# Patient Record
Sex: FEMALE | Race: WHITE | NOT HISPANIC OR LATINO | Employment: UNEMPLOYED | URBAN - METROPOLITAN AREA
[De-identification: names, ages, dates, MRNs, and addresses within clinical notes are randomized per-mention and may not be internally consistent; named-entity substitution may affect disease eponyms.]

---

## 2023-07-05 DIAGNOSIS — R10.2 PELVIC AND PERINEAL PAIN: ICD-10-CM

## 2023-07-06 RX ORDER — MELOXICAM 15 MG/1
15 TABLET ORAL DAILY
Qty: 30 TABLET | Refills: 1 | Status: SHIPPED | OUTPATIENT
Start: 2023-07-06 | End: 2023-09-21

## 2023-09-21 DIAGNOSIS — R10.2 PELVIC AND PERINEAL PAIN: ICD-10-CM

## 2023-09-21 RX ORDER — MELOXICAM 15 MG/1
15 TABLET ORAL DAILY
Qty: 30 TABLET | Refills: 1 | Status: SHIPPED | OUTPATIENT
Start: 2023-09-21 | End: 2023-11-20

## 2023-10-30 ENCOUNTER — OFFICE VISIT (OUTPATIENT)
Dept: PRIMARY CARE | Facility: CLINIC | Age: 38
End: 2023-10-30
Payer: COMMERCIAL

## 2023-10-30 VITALS
HEART RATE: 76 BPM | SYSTOLIC BLOOD PRESSURE: 107 MMHG | OXYGEN SATURATION: 99 % | HEIGHT: 62 IN | TEMPERATURE: 99.7 F | WEIGHT: 113.8 LBS | BODY MASS INDEX: 20.94 KG/M2 | DIASTOLIC BLOOD PRESSURE: 71 MMHG

## 2023-10-30 DIAGNOSIS — R09.81 NASAL CONGESTION: ICD-10-CM

## 2023-10-30 DIAGNOSIS — J01.00 ACUTE MAXILLARY SINUSITIS, RECURRENCE NOT SPECIFIED: Primary | ICD-10-CM

## 2023-10-30 DIAGNOSIS — R51.9 SINUS HEADACHE: ICD-10-CM

## 2023-10-30 PROCEDURE — 99213 OFFICE O/P EST LOW 20 MIN: CPT | Performed by: NURSE PRACTITIONER

## 2023-10-30 PROCEDURE — 1036F TOBACCO NON-USER: CPT | Performed by: NURSE PRACTITIONER

## 2023-10-30 RX ORDER — VALACYCLOVIR HYDROCHLORIDE 1 G/1
TABLET, FILM COATED ORAL
COMMUNITY

## 2023-10-30 RX ORDER — AZITHROMYCIN 250 MG/1
TABLET, FILM COATED ORAL
Qty: 6 TABLET | Refills: 0 | Status: SHIPPED | OUTPATIENT
Start: 2023-10-30 | End: 2023-11-04

## 2023-10-30 RX ORDER — CLOBETASOL PROPIONATE 0.46 MG/ML
SOLUTION TOPICAL
COMMUNITY
Start: 2022-02-23

## 2023-10-30 RX ORDER — KETOCONAZOLE 20 MG/ML
SHAMPOO, SUSPENSION TOPICAL
COMMUNITY
Start: 2022-02-23

## 2023-10-30 RX ORDER — PHENYLPROPANOLAMINE/CLEMASTINE 75-1.34MG
TABLET, EXTENDED RELEASE ORAL
COMMUNITY

## 2023-10-30 RX ORDER — ALBUTEROL SULFATE 90 UG/1
AEROSOL, METERED RESPIRATORY (INHALATION)
COMMUNITY
Start: 2017-04-27 | End: 2024-01-23 | Stop reason: SDUPTHER

## 2023-10-30 RX ORDER — MULTIVITAMIN
1 TABLET ORAL
COMMUNITY
Start: 2014-09-25

## 2023-10-30 RX ORDER — SPIRONOLACTONE 25 MG/1
25 TABLET ORAL 2 TIMES DAILY
COMMUNITY

## 2023-10-30 NOTE — PROGRESS NOTES
"Subjective   Patient ID: Lou Boateng is a 37 y.o. female who presents for sinus concern.    HPI   Patient in office for nasal congestion, facial pressure, sinus pressure, and sinus headache x 1 week. No other symptoms or concerns today.    Review of Systems  Constitutional:  Negative for fever. Negative for activity change, appetite change, chills, diaphoresis, fatigue and unexpected weight change.   HENT:  Positive for congestion, postnasal drip, sinus pressure, and sinus pain. Negative for sore throat, dental problem, ear discharge, ear pain, facial swelling, hearing loss, mouth sores, sore throat, nosebleeds, rhinorrhea, sneezing, tinnitus, trouble swallowing and voice change.    Eyes:  Negative for photophobia, pain, discharge, redness, itching and visual disturbance.   Respiratory:  Negative for cough. Negative for apnea, choking, chest tightness, shortness of breath, wheezing and stridor.    Cardiovascular:  Negative for chest pain, palpitations and leg swelling.   Gastrointestinal:  Negative for abdominal pain, diarrhea, nausea and vomiting.   Neurological:  Negative for dizziness, syncope, weakness, light-headedness. Positive for sinus headache.   Hematological:  Negative for adenopathy. Does not bruise/bleed easily.     Objective   /71   Pulse 76   Temp 37.6 °C (99.7 °F) (Temporal)   Ht 1.575 m (5' 2\")   Wt 51.6 kg (113 lb 12.8 oz)   SpO2 99%   BMI 20.81 kg/m²     Physical Exam  Constitutional:       Appearance: Normal appearance.   HENT:      Head: Normocephalic.      Right Ear: Tympanic membrane, ear canal and external ear normal.      Left Ear: Tympanic membrane, ear canal and external ear normal.      Nose: Congestion present.      Mouth/Throat:      Mouth: Mucous membranes are moist.      Pharynx: No oropharyngeal redness or exudate. Maxillary sinus areas tender to palpation.   Eyes:      Conjunctiva/sclera: Conjunctivae normal.   Cardiovascular:      Rate and Rhythm: Normal rate and " regular rhythm.      Pulses: Normal pulses.      Heart sounds:   Pulmonary:      Effort: Pulmonary effort is normal.      Breath sounds: normal  Musculoskeletal:      Cervical back: Neck supple. No rigidity or tenderness.      Right lower leg: No edema.      Left lower leg: No edema.   Lymphadenopathy:      Cervical: No cervical adenopathy.   Skin:     General: Skin is warm.      Coloration: Skin is not jaundiced or pale.   Neurological:      General: No focal deficit present.      Mental Status: She is alert.      Gait: Gait normal.     Assessment/Plan    Exam findings reviewed with patient. Medications discussed. Patient may use OTC Tylenol for pain and Flonase for nasal congestion. Advocated rest and proper hydration. Follow up in 1 week or earlier if no improvement.

## 2023-11-09 ENCOUNTER — OFFICE VISIT (OUTPATIENT)
Dept: PRIMARY CARE | Facility: CLINIC | Age: 38
End: 2023-11-09
Payer: COMMERCIAL

## 2023-11-09 VITALS
BODY MASS INDEX: 21.18 KG/M2 | TEMPERATURE: 98.6 F | OXYGEN SATURATION: 99 % | HEART RATE: 99 BPM | WEIGHT: 115.8 LBS | DIASTOLIC BLOOD PRESSURE: 74 MMHG | SYSTOLIC BLOOD PRESSURE: 106 MMHG

## 2023-11-09 DIAGNOSIS — R53.81 MALAISE: ICD-10-CM

## 2023-11-09 DIAGNOSIS — R09.81 NASAL CONGESTION: ICD-10-CM

## 2023-11-09 DIAGNOSIS — R51.9 SINUS HEADACHE: ICD-10-CM

## 2023-11-09 DIAGNOSIS — J02.9 SORE THROAT: Primary | ICD-10-CM

## 2023-11-09 LAB
POC RAPID MONO: NEGATIVE
POC RAPID STREP: NEGATIVE

## 2023-11-09 PROCEDURE — 99213 OFFICE O/P EST LOW 20 MIN: CPT | Performed by: NURSE PRACTITIONER

## 2023-11-09 PROCEDURE — 87634 RSV DNA/RNA AMP PROBE: CPT

## 2023-11-09 PROCEDURE — 1036F TOBACCO NON-USER: CPT | Performed by: NURSE PRACTITIONER

## 2023-11-09 PROCEDURE — 87636 SARSCOV2 & INF A&B AMP PRB: CPT

## 2023-11-09 PROCEDURE — 86308 HETEROPHILE ANTIBODY SCREEN: CPT | Performed by: NURSE PRACTITIONER

## 2023-11-09 PROCEDURE — 87880 STREP A ASSAY W/OPTIC: CPT | Performed by: NURSE PRACTITIONER

## 2023-11-09 PROCEDURE — 87081 CULTURE SCREEN ONLY: CPT

## 2023-11-09 NOTE — PROGRESS NOTES
Subjective   Patient ID: Lou Boateng is a 37 y.o. female who presents for recurring URI symptoms.    HPI   Patient in office for follow-up on nasal congestion, facial pressure, sinus pressure, and sinus headache. She was seen on 10/30/23 and started on an Z-pack. The symptoms stopped on the antibiotic but have now returned x 3 days. The patient complains of malaise and sore throat in addition to the previous symptoms.  Hx of tonsillectomy. Non-smoker. No other  concerns today.    I/O strep and mono are negative.     Review of Systems  Constitutional:  Negative for fever. Negative for activity change, appetite change, chills, diaphoresis, fatigue and unexpected weight change. Positive for malaise.  HENT:  Positive for congestion, sore throat, postnasal drip, sinus pressure, and sinus pain. Negative for sore throat, dental problem, ear discharge, ear pain, facial swelling, hearing loss, mouth sores, sore throat, nosebleeds, rhinorrhea, sneezing, tinnitus, trouble swallowing and voice change.    Eyes:  Negative for photophobia, pain, discharge, redness, itching and visual disturbance.   Respiratory:  Negative for cough. Negative for apnea, choking, chest tightness, shortness of breath, wheezing and stridor.    Cardiovascular:  Negative for chest pain, palpitations and leg swelling.   Gastrointestinal:  Negative for abdominal pain, diarrhea, nausea and vomiting.   Neurological:  Negative for dizziness, syncope, weakness, light-headedness. Positive for sinus headache.   Hematological:  Negative for adenopathy. Does not bruise/bleed easily.     Objective   /74   Pulse 99   Temp 37 °C (98.6 °F) (Temporal)   Wt 52.5 kg (115 lb 12.8 oz)   SpO2 99%   BMI 21.18 kg/m²     Physical Exam  Constitutional:       Appearance: Normal appearance.   HENT:      Head: Normocephalic.      Right Ear: Tympanic membrane, ear canal and external ear normal.      Left Ear: Tympanic membrane, ear canal and external ear normal.       Nose: Congestion present. Sinus areas are non-tender to palpation.      Mouth/Throat:      Mouth: Mucous membranes are moist.      Pharynx:  Mild oropharyngeal redness; no exudate. No tonsils visualized.    Eyes:      Conjunctiva/sclera: Conjunctivae normal.   Cardiovascular:      Rate and Rhythm: Normal rate and regular rhythm.      Pulses: Normal pulses.      Heart sounds:   Pulmonary:      Effort: Pulmonary effort is normal.      Breath sounds: normal  Musculoskeletal:      Cervical back: Neck supple. No rigidity or tenderness.      Right lower leg: No edema.      Left lower leg: No edema.   Lymphadenopathy:      Cervical: No cervical adenopathy.   Skin:     General: Skin is warm.      Coloration: Skin is not jaundiced or pale.   Neurological:      General: No focal deficit present.      Mental Status: She is alert.      Gait: Gait normal.     Assessment/Plan    Exam findings reviewed with patient. Medications discussed with patient. The patient may use Tylenol for fever and pain control, Flonase for nasal congestion, and lozenges for sore throat. Advised patient to avoid spicy, hot, and acidic foods and to gargle with tepid salt water 3-4x/day. Advocated rest and proper hydration and standard COVID, RSV, and Flu precautions. Emergent signs and symptoms reviewed: uncontrolled fever, shortness of breath or chest pain, SaO2 level <95% on room air, uncontrolled vomiting or diarrhea. The patient verbalized understanding. The patient knows where to get emergent help. We will call with lab results and update the patient on the plan of care. Follow up in 1 week or earlier if no improvement.

## 2023-11-10 ENCOUNTER — TELEPHONE (OUTPATIENT)
Dept: PRIMARY CARE | Facility: CLINIC | Age: 38
End: 2023-11-10
Payer: COMMERCIAL

## 2023-11-10 LAB
FLUAV RNA RESP QL NAA+PROBE: NOT DETECTED
FLUBV RNA RESP QL NAA+PROBE: NOT DETECTED
RSV RNA RESP QL NAA+PROBE: DETECTED
SARS-COV-2 RNA RESP QL NAA+PROBE: NOT DETECTED

## 2023-11-11 NOTE — RESULT ENCOUNTER NOTE
Patient notified of results. Etiology and treatment of RSV infection discussed. Contact precautions reviewed. Focus on symptom control discussed. The patient may continue to use OCT medications as needed. Advocated rest and proper hydration. Emergent signs and symptoms reviewed; the patient verbalized understanding. The patient knows where to get emergent help. Follow up in 1 week or earlier if no improvement.  and

## 2023-11-12 LAB — S PYO THROAT QL CULT: NORMAL

## 2023-11-19 DIAGNOSIS — R10.2 PELVIC AND PERINEAL PAIN: ICD-10-CM

## 2023-11-20 RX ORDER — MELOXICAM 15 MG/1
15 TABLET ORAL DAILY
Qty: 30 TABLET | Refills: 1 | Status: SHIPPED | OUTPATIENT
Start: 2023-11-20 | End: 2024-01-23 | Stop reason: SDUPTHER

## 2024-01-08 ENCOUNTER — HOSPITAL ENCOUNTER (OUTPATIENT)
Dept: RADIOLOGY | Facility: EXTERNAL LOCATION | Age: 39
Discharge: HOME | End: 2024-01-08
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
## 2024-01-23 ENCOUNTER — OFFICE VISIT (OUTPATIENT)
Dept: PRIMARY CARE | Facility: CLINIC | Age: 39
End: 2024-01-23
Payer: COMMERCIAL

## 2024-01-23 VITALS
TEMPERATURE: 97.8 F | SYSTOLIC BLOOD PRESSURE: 112 MMHG | BODY MASS INDEX: 19.12 KG/M2 | HEIGHT: 64 IN | HEART RATE: 88 BPM | OXYGEN SATURATION: 99 % | RESPIRATION RATE: 16 BRPM | DIASTOLIC BLOOD PRESSURE: 76 MMHG | WEIGHT: 112 LBS

## 2024-01-23 DIAGNOSIS — Z00.00 HEALTHCARE MAINTENANCE: Primary | ICD-10-CM

## 2024-01-23 DIAGNOSIS — H60.8X3 CHRONIC ECZEMATOUS OTITIS EXTERNA OF BOTH EARS: ICD-10-CM

## 2024-01-23 DIAGNOSIS — R10.2 PELVIC AND PERINEAL PAIN: ICD-10-CM

## 2024-01-23 DIAGNOSIS — Z12.31 BREAST CANCER SCREENING BY MAMMOGRAM: ICD-10-CM

## 2024-01-23 DIAGNOSIS — S16.1XXA STRAIN OF NECK MUSCLE, INITIAL ENCOUNTER: ICD-10-CM

## 2024-01-23 DIAGNOSIS — J30.9 ALLERGIC RHINITIS, UNSPECIFIED SEASONALITY, UNSPECIFIED TRIGGER: Primary | ICD-10-CM

## 2024-01-23 DIAGNOSIS — J45.909 REACTIVE AIRWAY DISEASE WITHOUT COMPLICATION, UNSPECIFIED ASTHMA SEVERITY, UNSPECIFIED WHETHER PERSISTENT (HHS-HCC): ICD-10-CM

## 2024-01-23 PROBLEM — L30.1 DYSHIDROTIC ECZEMA: Status: RESOLVED | Noted: 2024-01-23 | Resolved: 2024-01-23

## 2024-01-23 PROBLEM — B00.9 HERPES SIMPLEX: Status: RESOLVED | Noted: 2024-01-23 | Resolved: 2024-01-23

## 2024-01-23 PROBLEM — H10.10 ALLERGIC CONJUNCTIVITIS: Status: RESOLVED | Noted: 2024-01-23 | Resolved: 2024-01-23

## 2024-01-23 PROCEDURE — 87389 HIV-1 AG W/HIV-1&-2 AB AG IA: CPT

## 2024-01-23 PROCEDURE — 1036F TOBACCO NON-USER: CPT | Performed by: FAMILY MEDICINE

## 2024-01-23 PROCEDURE — 36415 COLL VENOUS BLD VENIPUNCTURE: CPT

## 2024-01-23 PROCEDURE — 90471 IMMUNIZATION ADMIN: CPT | Performed by: FAMILY MEDICINE

## 2024-01-23 PROCEDURE — 82947 ASSAY GLUCOSE BLOOD QUANT: CPT

## 2024-01-23 PROCEDURE — 90715 TDAP VACCINE 7 YRS/> IM: CPT | Performed by: FAMILY MEDICINE

## 2024-01-23 PROCEDURE — 99396 PREV VISIT EST AGE 40-64: CPT | Performed by: FAMILY MEDICINE

## 2024-01-23 PROCEDURE — 80061 LIPID PANEL: CPT

## 2024-01-23 RX ORDER — CYCLOBENZAPRINE HCL 5 MG
TABLET ORAL
COMMUNITY
Start: 2024-01-08 | End: 2024-01-23 | Stop reason: WASHOUT

## 2024-01-23 RX ORDER — MELOXICAM 15 MG/1
15 TABLET ORAL DAILY
Qty: 30 TABLET | Refills: 1 | Status: SHIPPED | OUTPATIENT
Start: 2024-01-23 | End: 2024-03-18

## 2024-01-23 RX ORDER — DICLOFENAC POTASSIUM 50 MG/1
50 TABLET, FILM COATED ORAL EVERY 12 HOURS PRN
COMMUNITY
Start: 2024-01-08 | End: 2024-01-23 | Stop reason: WASHOUT

## 2024-01-23 RX ORDER — FLUOCINOLONE ACETONIDE 0.11 MG/ML
OIL AURICULAR (OTIC)
Qty: 20 ML | Refills: 3 | Status: SHIPPED | OUTPATIENT
Start: 2024-01-23 | End: 2024-04-30 | Stop reason: WASHOUT

## 2024-01-23 RX ORDER — MELOXICAM 15 MG/1
15 TABLET ORAL DAILY
Qty: 30 TABLET | Refills: 1 | OUTPATIENT
Start: 2024-01-23

## 2024-01-23 RX ORDER — ALBUTEROL SULFATE 90 UG/1
2 AEROSOL, METERED RESPIRATORY (INHALATION) EVERY 4 HOURS PRN
Qty: 8 G | Refills: 5 | Status: SHIPPED | OUTPATIENT
Start: 2024-01-23 | End: 2024-04-30 | Stop reason: WASHOUT

## 2024-01-23 ASSESSMENT — ENCOUNTER SYMPTOMS
FREQUENCY: 0
DYSURIA: 0
COUGH: 0
ABDOMINAL PAIN: 0
POLYDIPSIA: 0
EYE DISCHARGE: 0
HEADACHES: 1
APPETITE CHANGE: 0
DIARRHEA: 0
AGITATION: 0
PALPITATIONS: 0
SORE THROAT: 0
VOMITING: 0
DIZZINESS: 0
CONFUSION: 0
ADENOPATHY: 0
SHORTNESS OF BREATH: 0
CHILLS: 0
BACK PAIN: 1
ABDOMINAL DISTENTION: 0
BLOOD IN STOOL: 0
RHINORRHEA: 0
WEAKNESS: 0
FEVER: 0
NAUSEA: 0
DIFFICULTY URINATING: 0
NUMBNESS: 0
SINUS PAIN: 0
HALLUCINATIONS: 0
CONSTIPATION: 0
MYALGIAS: 0

## 2024-01-23 ASSESSMENT — PROMIS GLOBAL HEALTH SCALE
RATE_AVERAGE_PAIN: 4
RATE_QUALITY_OF_LIFE: EXCELLENT
RATE_AVERAGE_FATIGUE: SEVERE
CARRYOUT_SOCIAL_ACTIVITIES: EXCELLENT
CARRYOUT_PHYSICAL_ACTIVITIES: COMPLETELY
RATE_MENTAL_HEALTH: VERY GOOD
RATE_SOCIAL_SATISFACTION: VERY GOOD
EMOTIONAL_PROBLEMS: SOMETIMES
RATE_PHYSICAL_HEALTH: VERY GOOD
RATE_GENERAL_HEALTH: VERY GOOD
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
## 2024-01-23 NOTE — ASSESSMENT & PLAN NOTE
Recommended if not needed number back pain is not improving within 2 weeks to make appointment physical therapy

## 2024-01-24 LAB
CHOLEST SERPL-MCNC: 163 MG/DL (ref 0–199)
CHOLESTEROL/HDL RATIO: 2.2
GLUCOSE P FAST SERPL-MCNC: 91 MG/DL (ref 74–99)
HDLC SERPL-MCNC: 73.6 MG/DL
HIV 1+2 AB+HIV1 P24 AG SERPL QL IA: NONREACTIVE
LDLC SERPL CALC-MCNC: 83 MG/DL
NON HDL CHOLESTEROL: 89 MG/DL (ref 0–149)
TRIGL SERPL-MCNC: 31 MG/DL (ref 0–149)
VLDL: 6 MG/DL (ref 0–40)

## 2024-02-05 ENCOUNTER — APPOINTMENT (OUTPATIENT)
Dept: PRIMARY CARE | Facility: CLINIC | Age: 39
End: 2024-02-05
Payer: COMMERCIAL

## 2024-02-13 ENCOUNTER — HOSPITAL ENCOUNTER (OUTPATIENT)
Dept: RADIOLOGY | Facility: HOSPITAL | Age: 39
Discharge: HOME | End: 2024-02-13
Payer: COMMERCIAL

## 2024-02-13 DIAGNOSIS — Z12.31 BREAST CANCER SCREENING BY MAMMOGRAM: ICD-10-CM

## 2024-02-13 PROCEDURE — 77063 BREAST TOMOSYNTHESIS BI: CPT | Performed by: RADIOLOGY

## 2024-02-13 PROCEDURE — 77067 SCR MAMMO BI INCL CAD: CPT

## 2024-02-13 PROCEDURE — 77067 SCR MAMMO BI INCL CAD: CPT | Performed by: RADIOLOGY

## 2024-02-21 DIAGNOSIS — B37.31 YEAST VAGINITIS: Primary | ICD-10-CM

## 2024-02-21 RX ORDER — FLUCONAZOLE 150 MG/1
TABLET ORAL
Qty: 2 TABLET | Refills: 0 | Status: SHIPPED | OUTPATIENT
Start: 2024-02-21 | End: 2024-04-30 | Stop reason: WASHOUT

## 2024-03-07 ENCOUNTER — APPOINTMENT (OUTPATIENT)
Dept: PRIMARY CARE | Facility: CLINIC | Age: 39
End: 2024-03-07
Payer: COMMERCIAL

## 2024-03-18 DIAGNOSIS — R10.2 PELVIC AND PERINEAL PAIN: ICD-10-CM

## 2024-03-18 RX ORDER — MELOXICAM 15 MG/1
15 TABLET ORAL DAILY
Qty: 30 TABLET | Refills: 1 | Status: SHIPPED | OUTPATIENT
Start: 2024-03-18 | End: 2024-04-30 | Stop reason: WASHOUT

## 2024-04-29 ENCOUNTER — PATIENT MESSAGE (OUTPATIENT)
Dept: PRIMARY CARE | Facility: CLINIC | Age: 39
End: 2024-04-29
Payer: COMMERCIAL

## 2024-04-30 ENCOUNTER — OFFICE VISIT (OUTPATIENT)
Dept: PRIMARY CARE | Facility: CLINIC | Age: 39
End: 2024-04-30
Payer: COMMERCIAL

## 2024-04-30 VITALS
DIASTOLIC BLOOD PRESSURE: 86 MMHG | TEMPERATURE: 98.3 F | SYSTOLIC BLOOD PRESSURE: 128 MMHG | BODY MASS INDEX: 19.35 KG/M2 | RESPIRATION RATE: 18 BRPM | HEART RATE: 96 BPM | OXYGEN SATURATION: 98 % | WEIGHT: 111 LBS

## 2024-04-30 DIAGNOSIS — F32.A DEPRESSION, UNSPECIFIED DEPRESSION TYPE: Primary | ICD-10-CM

## 2024-04-30 PROCEDURE — 90739 HEPB VACC 2/4 DOSE ADULT IM: CPT | Performed by: FAMILY MEDICINE

## 2024-04-30 PROCEDURE — 90471 IMMUNIZATION ADMIN: CPT | Performed by: FAMILY MEDICINE

## 2024-04-30 PROCEDURE — 99214 OFFICE O/P EST MOD 30 MIN: CPT | Performed by: FAMILY MEDICINE

## 2024-04-30 RX ORDER — ESCITALOPRAM OXALATE 10 MG/1
10 TABLET ORAL DAILY
Qty: 30 TABLET | Refills: 1 | Status: SHIPPED | OUTPATIENT
Start: 2024-04-30 | End: 2024-05-23

## 2024-04-30 RX ORDER — FLUTICASONE PROPIONATE 50 MCG
1 SPRAY, SUSPENSION (ML) NASAL DAILY
COMMUNITY

## 2024-04-30 NOTE — PROGRESS NOTES
Subjective   Patient ID: Lou Boateng is a 38 y.o. female who presents for Anxiety.  HPI    [place] tearful, often overwelmed with emotion     Has noticed feeling more emotional in both happy times and sad times and has difficulty controlling being more tearful.  She states that she easily feels overwhelmed.  She is not sure that she feels depressed otherwise but is willing to try medication to see if we can help decrease some of the symptoms     she also mentions that her OB/GYN thinks that she likely is perimenopausal, When she was told this a month ago she also became very tearful.        Review of Systems    Objective   Physical Exam  Constitutional:       Appearance: Normal appearance.   Cardiovascular:      Rate and Rhythm: Normal rate and regular rhythm.   Pulmonary:      Effort: Pulmonary effort is normal.      Breath sounds: Normal breath sounds.   Skin:     General: Skin is warm and dry.   Neurological:      Mental Status: She is alert.   Psychiatric:      Comments: Good eye contact, tearful during appointment         Assessment/Plan   Problem List Items Addressed This Visit    None  Visit Diagnoses       Depression, unspecified depression type    -  Primary    Relevant Medications    escitalopram (Lexapro) 10 mg tablet        Follow-up in 1 month

## 2024-05-06 NOTE — PROGRESS NOTES
Subjective   Patient ID:   [id number]   Lou Boateng is a 38 y.o. female who presents for Allergies (Pt referred by PCP, in PAST PT  HAS BEEN GIVEN KENALOG INJECTIONS. PT IS NOT ABLE TO GO OFF OF ANTIHISTAMINES FOR 5 DAYS ).    Chief Complaint   Patient presents with    Allergies     Pt referred by PCP, in PAST PT  HAS BEEN GIVEN KENALOG INJECTIONS. PT IS NOT ABLE TO GO OFF OF ANTIHISTAMINES FOR 5 DAYS       This is a new patient, with a H/O chronic eczematous otitis externa, referred by Elicia Gotti MD, PCP, for evaluation of allergy symptoms.      Patient reports a longstanding H/O allergy Sx in the past and tested in the past showing allergy to animals, trees, mold, dust and pollens.  She underwent Kenalog shots.  She notes she will react immediately if an animal comes near her and licks her.  She has no pets and has employed environmental controls.  She saw Dr. Tapia and started allergy shots with him but stopped due to pregnancy.  She felt he had her on too many medications.  Singulair caused nightmares so she stopped them.  She alternates between Zyrtec and Allegra D for nasal congestion.  She also uses Flonase BID and Pataday and Systane eyedrops for severe eye Sx.  She has ear itching and scratched until she alvin blood.  She was prescribed an oil for ear dermatitis.  She denies nasal bleeding from the Flonase and denies headaches.  On symptomatic days, she may have some phonophobia.  Patient denies any nasal trauma, injury or fracture.    Patient and her  purchased a home in [place] and she thought she was doing well with no Sx.  She developed sore throat and left [place].  When she arrived in [place], her sore throat resolved spontaneously.      Dr. Tapia performed a breathing Test and Dxd her with asthma.  She was treated with steroids intermittently as well as albuterol PRN.  She has not used albuterol last year.    Review of Systems   HENT:          Ear itching.     Eyes:  Positive for  redness and itching.     Objective     /75   Wt 52.6 kg (116 lb)   SpO2 97%   BMI 20.23 kg/m²      Physical Exam  Constitutional:       Appearance: Normal appearance.   HENT:      Head: Normocephalic and atraumatic.      Right Ear: External ear normal. There is no impacted cerumen.      Left Ear: External ear normal. There is no impacted cerumen.      Nose: Congestion (bilateral nasal turbinate edema) present. No rhinorrhea.   Eyes:      Extraocular Movements: Extraocular movements intact.      Conjunctiva/sclera: Conjunctivae normal.      Pupils: Pupils are equal, round, and reactive to light.   Cardiovascular:      Rate and Rhythm: Normal rate and regular rhythm.      Heart sounds: No murmur heard.     No friction rub. No gallop.   Pulmonary:      Effort: No respiratory distress.      Breath sounds: No wheezing, rhonchi or rales.   Skin:     General: Skin is warm and dry.   Neurological:      Mental Status: She is alert.   Psychiatric:         Mood and Affect: Mood normal.         Behavior: Behavior normal.       Skin testing was not performed due to the patient being on antihistamines    Assessment/Plan         Allergic rhinitis  I have ordered ImmunoCAP testing to indoor and outdoor allergens.  Based on her testing I would like her to start allergy immunotherapy.  She has a second home in [place] so I ordered select allergens that are prominent in [place].      By signing my name below, I, Phani Enriquez, attest that this documentation has been prepared under the direction and in the presence of Tanisha Jackson MD.  All medical record entries made by the Nataleeibe were at my direction and personally dictated by me. I have reviewed the chart and agree that the record accurately reflects my personal performance of the history, physical exam, discussion and plan.

## 2024-05-07 ENCOUNTER — CONSULT (OUTPATIENT)
Dept: ALLERGY | Facility: CLINIC | Age: 39
End: 2024-05-07
Payer: COMMERCIAL

## 2024-05-07 ENCOUNTER — LAB (OUTPATIENT)
Dept: LAB | Facility: LAB | Age: 39
End: 2024-05-07
Payer: COMMERCIAL

## 2024-05-07 VITALS
WEIGHT: 116 LBS | DIASTOLIC BLOOD PRESSURE: 75 MMHG | OXYGEN SATURATION: 97 % | SYSTOLIC BLOOD PRESSURE: 109 MMHG | BODY MASS INDEX: 20.23 KG/M2

## 2024-05-07 DIAGNOSIS — J30.9 ALLERGIC RHINITIS, UNSPECIFIED SEASONALITY, UNSPECIFIED TRIGGER: Primary | ICD-10-CM

## 2024-05-07 DIAGNOSIS — J30.9 ALLERGIC RHINITIS, UNSPECIFIED SEASONALITY, UNSPECIFIED TRIGGER: ICD-10-CM

## 2024-05-07 PROCEDURE — 82785 ASSAY OF IGE: CPT

## 2024-05-07 PROCEDURE — 86003 ALLG SPEC IGE CRUDE XTRC EA: CPT

## 2024-05-07 PROCEDURE — 99204 OFFICE O/P NEW MOD 45 MIN: CPT | Performed by: ALLERGY & IMMUNOLOGY

## 2024-05-07 PROCEDURE — 36415 COLL VENOUS BLD VENIPUNCTURE: CPT

## 2024-05-07 ASSESSMENT — ENCOUNTER SYMPTOMS
EYE ITCHING: 1
EYE REDNESS: 1

## 2024-05-07 NOTE — PATIENT INSTRUCTIONS
Obtain blood work to evaluate allergies.  We will call you with results, recommendations and followup plan.    Continue Zyrtec as needed and may increase to twice a day.    Continue over the counter Flonase 2 sprays each side one time a day.  To increase the efficacy of your nasal spray, be sure to look down while using it.?  Spray slightly away from the direction of your nasal septum (the bone in the middle of your nose) and only sniff after you have sprayed - avoid spraying and sniffing at the same time, or else a lot of spray will go down your throat.    Continue Pataday Extra Strength over the counter one drop each eye 1-2 times a day as needed.

## 2024-05-09 LAB
A ALTERNATA IGE QN: <0.1 KU/L
A FUMIGATUS IGE QN: <0.1 KU/L
ANNOTATION COMMENT IMP: NORMAL
BERMUDA GRASS IGE QN: <0.1 KU/L
BOXELDER IGE QN: <0.1 KU/L
C HERBARUM IGE QN: <0.1 KU/L
CALIF WALNUT POLN IGE QN: 0.11 KU/L
CAT DANDER IGE QN: 0.81 KU/L
CMN PIGWEED IGE QN: <0.1 KU/L
COMMON RAGWEED IGE QN: <0.1 KU/L
COTTONWOOD IGE QN: <0.1 KU/L
D FARINAE IGE QN: 0.36 KU/L
D PTERONYSS IGE QN: 0.3 KU/L
DOG DANDER IGE QN: 0.2 KU/L
EAST WHITE PINE IGE QN: <0.1 KU/L
ENGL PLANTAIN IGE QN: <0.1 KU/L
GOOSEFOOT IGE QN: <0.1 KU/L
JOHNSON GRASS IGE QN: <0.1 KU/L
KENT BLUE GRASS IGE QN: <0.1 KU/L
LONDON PLANE IGE QN: <0.1 KU/L
MT JUNIPER IGE QN: <0.1 KU/L
P NOTATUM IGE QN: <0.1 KU/L
PECAN/HICK TREE IGE QN: 0.14 KU/L
ROACH IGE QN: <0.1 KU/L
SALTWORT IGE QN: <0.1 KU/L
SHEEP SORREL IGE QN: 0.2 KU/L
SILVER BIRCH IGE QN: 3.91 KU/L
TIMOTHY IGE QN: <0.1 KU/L
TOTAL IGE SMQN RAST: 34.6 KU/L
WHITE ASH IGE QN: <0.1 KU/L
WHITE ELM IGE QN: 0.22 KU/L
WHITE MULBERRY IGE QN: <0.1 KU/L
WHITE OAK IGE QN: 6.64 KU/L

## 2024-05-10 NOTE — ASSESSMENT & PLAN NOTE
I have ordered ImmunoCAP testing to indoor and outdoor allergens.  Based on her testing I would like her to start allergy immunotherapy.  She has a second home in [place] so I ordered select allergens that are prominent in [place].

## 2024-05-13 LAB
ITALIAN CYPRESS IGE QN: <0.1 KU/L
QUEEN PALM IGE QN: <0.1 KU/L

## 2024-05-22 DIAGNOSIS — F32.A DEPRESSION, UNSPECIFIED DEPRESSION TYPE: ICD-10-CM

## 2024-05-23 RX ORDER — ESCITALOPRAM OXALATE 10 MG/1
10 TABLET ORAL DAILY
Qty: 90 TABLET | Refills: 1 | Status: SHIPPED | OUTPATIENT
Start: 2024-05-23

## 2024-06-03 ENCOUNTER — OFFICE VISIT (OUTPATIENT)
Dept: PRIMARY CARE | Facility: CLINIC | Age: 39
End: 2024-06-03
Payer: COMMERCIAL

## 2024-06-03 VITALS
HEART RATE: 86 BPM | RESPIRATION RATE: 16 BRPM | OXYGEN SATURATION: 98 % | SYSTOLIC BLOOD PRESSURE: 100 MMHG | BODY MASS INDEX: 19.88 KG/M2 | TEMPERATURE: 98.1 F | DIASTOLIC BLOOD PRESSURE: 68 MMHG | WEIGHT: 114 LBS

## 2024-06-03 DIAGNOSIS — F41.9 ANXIETY: Primary | ICD-10-CM

## 2024-06-03 PROCEDURE — 99213 OFFICE O/P EST LOW 20 MIN: CPT | Performed by: FAMILY MEDICINE

## 2024-06-03 PROCEDURE — 90739 HEPB VACC 2/4 DOSE ADULT IM: CPT | Performed by: FAMILY MEDICINE

## 2024-06-03 PROCEDURE — 90471 IMMUNIZATION ADMIN: CPT | Performed by: FAMILY MEDICINE

## 2024-06-03 RX ORDER — FLUOXETINE HYDROCHLORIDE 20 MG/1
20 CAPSULE ORAL DAILY
Qty: 30 CAPSULE | Refills: 3 | Status: SHIPPED | OUTPATIENT
Start: 2024-06-03 | End: 2024-10-01

## 2024-06-03 NOTE — PROGRESS NOTES
Subjective   Patient ID: Lou Boateng is a 38 y.o. female who presents for Follow-up (Hep B #2, new med).  HPI    Overall patient states she feels like she is more in control of her emotions and less overwhelmingly tearful with the Lexapro but she does feel that it makes her more tired.  She feels like she has to take a 20-minute nap 2 or 3 times a day just to get through the day.  She states that she has always been tired but he does seem worse since she has been on the Lexapro.          Review of Systems    Objective   Physical Exam  Constitutional:       Appearance: Normal appearance.   Neurological:      Mental Status: She is alert.   Psychiatric:         Mood and Affect: Mood normal.         Behavior: Behavior normal.         Assessment/Plan   Problem List Items Addressed This Visit    None  Visit Diagnoses       Anxiety    -  Primary    Relevant Medications    FLUoxetine (PROzac) 20 mg capsule

## 2024-09-24 ENCOUNTER — APPOINTMENT (OUTPATIENT)
Dept: ALLERGY | Facility: CLINIC | Age: 39
End: 2024-09-24
Payer: COMMERCIAL

## 2024-09-26 ENCOUNTER — APPOINTMENT (OUTPATIENT)
Dept: RADIOLOGY | Facility: HOSPITAL | Age: 39
End: 2024-09-26
Payer: COMMERCIAL

## 2024-10-01 ENCOUNTER — APPOINTMENT (OUTPATIENT)
Dept: PRIMARY CARE | Facility: CLINIC | Age: 39
End: 2024-10-01
Payer: COMMERCIAL

## 2024-10-01 VITALS
SYSTOLIC BLOOD PRESSURE: 109 MMHG | OXYGEN SATURATION: 97 % | HEART RATE: 84 BPM | RESPIRATION RATE: 16 BRPM | TEMPERATURE: 97.7 F | WEIGHT: 111.6 LBS | BODY MASS INDEX: 19.46 KG/M2 | DIASTOLIC BLOOD PRESSURE: 64 MMHG

## 2024-10-01 DIAGNOSIS — Z12.31 BREAST CANCER SCREENING BY MAMMOGRAM: ICD-10-CM

## 2024-10-01 DIAGNOSIS — F41.9 ANXIETY: Primary | ICD-10-CM

## 2024-10-01 PROCEDURE — 90656 IIV3 VACC NO PRSV 0.5 ML IM: CPT | Performed by: FAMILY MEDICINE

## 2024-10-01 PROCEDURE — 1036F TOBACCO NON-USER: CPT | Performed by: FAMILY MEDICINE

## 2024-10-01 PROCEDURE — 90471 IMMUNIZATION ADMIN: CPT | Performed by: FAMILY MEDICINE

## 2024-10-01 PROCEDURE — 99214 OFFICE O/P EST MOD 30 MIN: CPT | Performed by: FAMILY MEDICINE

## 2024-10-01 RX ORDER — FLUOXETINE HYDROCHLORIDE 40 MG/1
40 CAPSULE ORAL DAILY
Qty: 30 CAPSULE | Refills: 3 | Status: SHIPPED | OUTPATIENT
Start: 2024-10-01 | End: 2025-01-29

## 2024-10-01 NOTE — ASSESSMENT & PLAN NOTE
Doing well on prozac but still feels overwhelmed about twice a week.  Will increase to 40 mg daily.

## 2024-10-01 NOTE — PROGRESS NOTES
Subjective   Patient ID: Lou Boateng is a 38 y.o. female who presents for Anxiety.  HPI    Overall patient states her anxiety has been much better controlled on the fluoxetine.  She states that she is coping with everyday stressors much better things are not as irritating.  Initially she had a little more energy but that has since dissipated.  Tolerating Prozac well with good adherence but is wondering if we could increase the dose because she typically still has 2 days a week that are more overwhelming.      Review of Systems    Objective   Physical Exam  Constitutional:       Appearance: Normal appearance.   Cardiovascular:      Rate and Rhythm: Normal rate and regular rhythm.   Pulmonary:      Effort: Pulmonary effort is normal.      Breath sounds: Normal breath sounds.   Abdominal:      General: Abdomen is flat. Bowel sounds are normal.      Palpations: Abdomen is soft.   Skin:     General: Skin is warm and dry.   Neurological:      Mental Status: She is alert.   Psychiatric:         Mood and Affect: Mood normal.         Behavior: Behavior normal.         Assessment/Plan   Problem List Items Addressed This Visit       Anxiety - Primary     Doing well on prozac but still feels overwhelmed about twice a week.  Will increase to 40 mg daily.            Relevant Medications    FLUoxetine (PROzac) 40 mg capsule     Other Visit Diagnoses       Breast cancer screening by mammogram        Relevant Orders    BI mammo bilateral screening tomosynthesis          Keep appt in Jan 2025

## 2024-10-16 ENCOUNTER — APPOINTMENT (OUTPATIENT)
Dept: RADIOLOGY | Facility: HOSPITAL | Age: 39
End: 2024-10-16
Payer: COMMERCIAL

## 2024-10-23 ENCOUNTER — HOSPITAL ENCOUNTER (OUTPATIENT)
Dept: RADIOLOGY | Facility: HOSPITAL | Age: 39
Discharge: HOME | End: 2024-10-23

## 2024-10-23 DIAGNOSIS — Z12.31 BREAST CANCER SCREENING BY MAMMOGRAM: ICD-10-CM

## 2024-10-23 PROCEDURE — A9575 INJ GADOTERATE MEGLUMI 0.1ML: HCPCS | Performed by: FAMILY MEDICINE

## 2024-10-23 PROCEDURE — 6100000003 BI MR BREAST BILATERAL WITH CONTRAST FAST SCREENING SELF PAY

## 2024-10-23 PROCEDURE — 2550000001 HC RX 255 CONTRASTS: Performed by: FAMILY MEDICINE

## 2024-10-23 RX ORDER — GADOTERATE MEGLUMINE 376.9 MG/ML
10 INJECTION INTRAVENOUS
Status: COMPLETED | OUTPATIENT
Start: 2024-10-23 | End: 2024-10-23

## 2024-10-29 ENCOUNTER — APPOINTMENT (OUTPATIENT)
Dept: ALLERGY | Facility: CLINIC | Age: 39
End: 2024-10-29
Payer: COMMERCIAL

## 2024-10-29 VITALS — BODY MASS INDEX: 19.49 KG/M2 | HEART RATE: 70 BPM | WEIGHT: 110 LBS | OXYGEN SATURATION: 98 %

## 2024-10-29 DIAGNOSIS — J30.9 ALLERGIC RHINITIS, UNSPECIFIED SEASONALITY, UNSPECIFIED TRIGGER: Primary | ICD-10-CM

## 2024-10-29 PROCEDURE — 95004 PERQ TESTS W/ALRGNC XTRCS: CPT | Performed by: ALLERGY & IMMUNOLOGY

## 2024-10-29 PROCEDURE — 99214 OFFICE O/P EST MOD 30 MIN: CPT | Performed by: ALLERGY & IMMUNOLOGY

## 2024-10-29 PROCEDURE — 95024 IQ TESTS W/ALLERGENIC XTRCS: CPT | Performed by: ALLERGY & IMMUNOLOGY

## 2024-10-29 PROCEDURE — 1036F TOBACCO NON-USER: CPT | Performed by: ALLERGY & IMMUNOLOGY

## 2024-10-29 RX ORDER — AZELAIC ACID 0.15 G/G
1 GEL TOPICAL
COMMUNITY
Start: 2024-09-30

## 2024-10-31 ENCOUNTER — PATIENT MESSAGE (OUTPATIENT)
Dept: PRIMARY CARE | Facility: CLINIC | Age: 39
End: 2024-10-31
Payer: COMMERCIAL

## 2024-10-31 DIAGNOSIS — F41.9 ANXIETY: ICD-10-CM

## 2024-10-31 RX ORDER — FLUOXETINE HYDROCHLORIDE 20 MG/1
20 CAPSULE ORAL DAILY
Qty: 30 CAPSULE | Refills: 3 | Status: SHIPPED | OUTPATIENT
Start: 2024-10-31 | End: 2025-02-28

## 2024-11-04 DIAGNOSIS — J30.89 PERENNIAL ALLERGIC RHINITIS: ICD-10-CM

## 2024-11-04 DIAGNOSIS — J30.1 HAY FEVER: Primary | ICD-10-CM

## 2024-11-04 PROCEDURE — 95165 ANTIGEN THERAPY SERVICES: CPT | Performed by: ALLERGY & IMMUNOLOGY

## 2025-01-24 ENCOUNTER — APPOINTMENT (OUTPATIENT)
Dept: PRIMARY CARE | Facility: CLINIC | Age: 40
End: 2025-01-24
Payer: COMMERCIAL

## 2025-01-24 VITALS
HEIGHT: 63 IN | DIASTOLIC BLOOD PRESSURE: 70 MMHG | TEMPERATURE: 97.9 F | WEIGHT: 115 LBS | HEART RATE: 73 BPM | BODY MASS INDEX: 20.38 KG/M2 | SYSTOLIC BLOOD PRESSURE: 98 MMHG | RESPIRATION RATE: 16 BRPM | OXYGEN SATURATION: 99 %

## 2025-01-24 DIAGNOSIS — B00.9 HERPES SIMPLEX: ICD-10-CM

## 2025-01-24 DIAGNOSIS — Z00.00 HEALTHCARE MAINTENANCE: Primary | ICD-10-CM

## 2025-01-24 DIAGNOSIS — Z12.31 BREAST CANCER SCREENING BY MAMMOGRAM: ICD-10-CM

## 2025-01-24 DIAGNOSIS — S16.1XXS STRAIN OF NECK MUSCLE, SEQUELA: ICD-10-CM

## 2025-01-24 DIAGNOSIS — M25.551 RIGHT HIP PAIN: ICD-10-CM

## 2025-01-24 DIAGNOSIS — L70.9 ACNE, UNSPECIFIED ACNE TYPE: ICD-10-CM

## 2025-01-24 DIAGNOSIS — F41.9 ANXIETY: ICD-10-CM

## 2025-01-24 LAB
ANION GAP SERPL CALC-SCNC: 13 MMOL/L (ref 10–20)
BUN SERPL-MCNC: 13 MG/DL (ref 6–23)
CALCIUM SERPL-MCNC: 9.2 MG/DL (ref 8.6–10.6)
CHLORIDE SERPL-SCNC: 108 MMOL/L (ref 98–107)
CHOLEST SERPL-MCNC: 168 MG/DL (ref 0–199)
CHOLESTEROL/HDL RATIO: 2.7
CO2 SERPL-SCNC: 24 MMOL/L (ref 21–32)
CREAT SERPL-MCNC: 0.55 MG/DL (ref 0.5–1.05)
EGFRCR SERPLBLD CKD-EPI 2021: >90 ML/MIN/1.73M*2
GLUCOSE SERPL-MCNC: 82 MG/DL (ref 74–99)
HDLC SERPL-MCNC: 62.4 MG/DL
LDLC SERPL CALC-MCNC: 96 MG/DL
NON HDL CHOLESTEROL: 106 MG/DL (ref 0–149)
POTASSIUM SERPL-SCNC: 5.7 MMOL/L (ref 3.5–5.3)
SODIUM SERPL-SCNC: 139 MMOL/L (ref 136–145)
TRIGL SERPL-MCNC: 46 MG/DL (ref 0–149)
VLDL: 9 MG/DL (ref 0–40)

## 2025-01-24 PROCEDURE — 80061 LIPID PANEL: CPT

## 2025-01-24 PROCEDURE — 3008F BODY MASS INDEX DOCD: CPT | Performed by: FAMILY MEDICINE

## 2025-01-24 PROCEDURE — 99396 PREV VISIT EST AGE 40-64: CPT | Performed by: FAMILY MEDICINE

## 2025-01-24 PROCEDURE — 90480 ADMN SARSCOV2 VAC 1/ONLY CMP: CPT | Performed by: FAMILY MEDICINE

## 2025-01-24 PROCEDURE — 1036F TOBACCO NON-USER: CPT | Performed by: FAMILY MEDICINE

## 2025-01-24 PROCEDURE — 91320 SARSCV2 VAC 30MCG TRS-SUC IM: CPT | Performed by: FAMILY MEDICINE

## 2025-01-24 PROCEDURE — 80048 BASIC METABOLIC PNL TOTAL CA: CPT

## 2025-01-24 RX ORDER — SPIRONOLACTONE 25 MG/1
25 TABLET ORAL 2 TIMES DAILY
Qty: 90 TABLET | Refills: 1 | Status: SHIPPED | OUTPATIENT
Start: 2025-01-24

## 2025-01-24 RX ORDER — VALACYCLOVIR HYDROCHLORIDE 1 G/1
1 TABLET, FILM COATED ORAL DAILY
Qty: 90 TABLET | Refills: 1 | Status: SHIPPED | OUTPATIENT
Start: 2025-01-24

## 2025-01-24 RX ORDER — MELOXICAM 15 MG/1
15 TABLET ORAL DAILY PRN
Qty: 30 TABLET | Refills: 3 | Status: SHIPPED | OUTPATIENT
Start: 2025-01-24

## 2025-01-24 RX ORDER — MELOXICAM 15 MG/1
15 TABLET ORAL DAILY PRN
COMMUNITY
End: 2025-01-24 | Stop reason: SDUPTHER

## 2025-01-24 RX ORDER — FLUOXETINE HYDROCHLORIDE 20 MG/1
20 CAPSULE ORAL DAILY
Qty: 90 CAPSULE | Refills: 1 | Status: SHIPPED | OUTPATIENT
Start: 2025-01-24

## 2025-01-24 ASSESSMENT — ENCOUNTER SYMPTOMS
AGITATION: 0
SORE THROAT: 0
VOMITING: 0
APPETITE CHANGE: 0
WEAKNESS: 0
FEVER: 0
POLYDIPSIA: 0
FREQUENCY: 0
CHILLS: 0
BLOOD IN STOOL: 0
PALPITATIONS: 0
CONFUSION: 0
RHINORRHEA: 0
COUGH: 0
HEADACHES: 0
NUMBNESS: 0
SINUS PAIN: 0
MYALGIAS: 0
ABDOMINAL DISTENTION: 0
NAUSEA: 0
ADENOPATHY: 0
DIARRHEA: 0
SHORTNESS OF BREATH: 0
HALLUCINATIONS: 0
ABDOMINAL PAIN: 0
BACK PAIN: 0
DYSURIA: 0
EYE DISCHARGE: 0
CONSTIPATION: 0
DIZZINESS: 0
DIFFICULTY URINATING: 0

## 2025-01-24 NOTE — ASSESSMENT & PLAN NOTE
Orders:    meloxicam (Mobic) 15 mg tablet; Take 1 tablet (15 mg) by mouth once daily as needed for moderate pain (4 - 6).

## 2025-01-24 NOTE — PROGRESS NOTES
Subjective   Patient ID: Lou Boateng is a 39 y.o. female who presents for Annual Exam.     Well Adult Physical   Patient here for a comprehensive physical exam.The patient reports right hip pain, worse after sitting for a while when she goes to stand up.  Has been ongoing since Oct when she was in a prolonged awkward position with her hip externally rotated.      Diet: Well balanced     Exercise: yoga, walking    Social History    Tobacco Use      Smoking status: Never      Smokeless tobacco: Never    Alcohol use: Yes      Alcohol/week: 3.0 standard drinks of alcohol      Types: 3 Glasses of wine per week    Drug use: Never        Immunization History  Administered            Date(s) Administered    Flu vaccine (IIV4), preservative free *Check age/dose*                          09/28/2020      Flu vaccine, trivalent, preservative free, age 6 months and greater (Fluarix/Fluzone/Flulaval)                          10/01/2024      Hepatitis B vaccine, 18yrs and older(HEPLISAV)                          04/30/2024 06/03/2024      Influenza, Unspecified                          11/01/2015 11/22/2021      Influenza, live, intranasal                          12/03/2015      Influenza, seasonal, injectable                          10/01/2015  11/01/2015  09/20/2017      Pfizer Purple Cap SARS-CoV-2                          04/22/2021  05/15/2021  11/24/2021      Pneumococcal polysaccharide vaccine, 23-valent, age 2 years and older (PNEUMOVAX 23)                          04/27/2017  10/18/2017      Tdap vaccine, age 7 year and older (BOOSTRIX, ADACEL)                          07/04/2012 01/23/2024      Menstrual cycle   When was your last period: 1/20/25   How often do they occur: monthly   Do you have any concerns about your period: no    Contraception: condoms    Last pap: [place]  History of abnormal pap:    Last mammogram: 2/2024                       Review of Systems   Constitutional:  Negative for appetite  change, chills and fever.   HENT:  Negative for ear pain, rhinorrhea, sinus pain and sore throat.    Eyes:  Negative for discharge and visual disturbance.   Respiratory:  Negative for cough and shortness of breath.    Cardiovascular:  Negative for chest pain, palpitations and leg swelling.   Gastrointestinal:  Negative for abdominal distention, abdominal pain, blood in stool, constipation, diarrhea, nausea and vomiting.   Endocrine: Negative for cold intolerance, heat intolerance, polydipsia and polyuria.   Genitourinary:  Negative for difficulty urinating, dysuria and frequency.   Musculoskeletal:  Negative for back pain and myalgias.   Skin:  Negative for rash.   Neurological:  Negative for dizziness, weakness, numbness and headaches.   Hematological:  Negative for adenopathy.   Psychiatric/Behavioral:  Negative for agitation, confusion and hallucinations.        Objective   Physical Exam  Constitutional:       Appearance: Normal appearance.   Cardiovascular:      Rate and Rhythm: Normal rate and regular rhythm.   Pulmonary:      Effort: Pulmonary effort is normal.      Breath sounds: Normal breath sounds.   Chest:   Breasts:     Breasts are symmetrical.      Right: No mass, nipple discharge or skin change.      Left: No mass, nipple discharge or skin change.   Abdominal:      General: Abdomen is flat. Bowel sounds are normal.      Palpations: Abdomen is soft.   Musculoskeletal:      Comments: Inspection of right hip normal, tenderness to palpation along the anterior joint line but worse along the lateral edge.  Pain with external rotation and hyperflexion.     Lymphadenopathy:      Upper Body:      Right upper body: No axillary adenopathy.      Left upper body: No axillary adenopathy.   Skin:     General: Skin is warm and dry.   Neurological:      Mental Status: She is alert.   Psychiatric:         Mood and Affect: Mood normal.         Behavior: Behavior normal.         Assessment & Plan  Anxiety  Well-controlled  on fluoxetine  Orders:    FLUoxetine (PROzac) 20 mg capsule; Take 1 capsule (20 mg) by mouth once daily.    Right hip pain    Orders:    Referral to Physical Therapy; Future    Healthcare maintenance    Orders:    Lipid Panel    Basic Metabolic Panel    Acne, unspecified acne type    Orders:    spironolactone (Aldactone) 25 mg tablet; Take 1 tablet (25 mg) by mouth 2 times a day.    Basic Metabolic Panel    Herpes simplex    Orders:    valACYclovir (Valtrex) 1 gram tablet; Take 1 tablet (1,000 mg) by mouth once daily.    Strain of neck muscle, sequela    Orders:    meloxicam (Mobic) 15 mg tablet; Take 1 tablet (15 mg) by mouth once daily as needed for moderate pain (4 - 6).    Breast cancer screening by mammogram    Orders:    BI mammo bilateral screening tomosynthesis; Future

## 2025-01-24 NOTE — ASSESSMENT & PLAN NOTE
Well-controlled on fluoxetine  Orders:    FLUoxetine (PROzac) 20 mg capsule; Take 1 capsule (20 mg) by mouth once daily.

## 2025-01-30 DIAGNOSIS — E87.5 HYPERKALEMIA: Primary | ICD-10-CM

## 2025-02-01 LAB
ANION GAP SERPL CALCULATED.4IONS-SCNC: 11 MMOL/L (CALC) (ref 7–17)
BUN SERPL-MCNC: 11 MG/DL (ref 7–25)
BUN/CREAT SERPL: NORMAL (CALC) (ref 6–22)
CALCIUM SERPL-MCNC: 9.7 MG/DL (ref 8.6–10.2)
CHLORIDE SERPL-SCNC: 103 MMOL/L (ref 98–110)
CO2 SERPL-SCNC: 25 MMOL/L (ref 20–32)
CREAT SERPL-MCNC: 0.64 MG/DL (ref 0.5–0.99)
EGFRCR SERPLBLD CKD-EPI 2021: 114 ML/MIN/1.73M2
GLUCOSE SERPL-MCNC: 93 MG/DL (ref 65–139)
POTASSIUM SERPL-SCNC: 4.2 MMOL/L (ref 3.5–5.3)
SODIUM SERPL-SCNC: 139 MMOL/L (ref 135–146)

## 2025-06-05 DIAGNOSIS — S16.1XXS STRAIN OF NECK MUSCLE, SEQUELA: ICD-10-CM

## 2025-06-05 RX ORDER — MELOXICAM 15 MG/1
15 TABLET ORAL DAILY PRN
Qty: 30 TABLET | Refills: 3 | Status: SHIPPED | OUTPATIENT
Start: 2025-06-05

## 2025-07-01 ENCOUNTER — APPOINTMENT (OUTPATIENT)
Dept: PRIMARY CARE | Facility: CLINIC | Age: 40
End: 2025-07-01
Payer: COMMERCIAL

## 2025-07-01 VITALS
WEIGHT: 123.2 LBS | RESPIRATION RATE: 16 BRPM | HEART RATE: 73 BPM | TEMPERATURE: 97.3 F | BODY MASS INDEX: 21.82 KG/M2 | SYSTOLIC BLOOD PRESSURE: 105 MMHG | DIASTOLIC BLOOD PRESSURE: 67 MMHG | OXYGEN SATURATION: 100 %

## 2025-07-01 DIAGNOSIS — F41.9 ANXIETY: Primary | ICD-10-CM

## 2025-07-01 DIAGNOSIS — Z12.31 BREAST CANCER SCREENING BY MAMMOGRAM: ICD-10-CM

## 2025-07-01 PROBLEM — S16.1XXA CERVICAL STRAIN: Status: RESOLVED | Noted: 2024-01-23 | Resolved: 2025-07-01

## 2025-07-01 PROCEDURE — 99213 OFFICE O/P EST LOW 20 MIN: CPT | Performed by: FAMILY MEDICINE

## 2025-07-01 RX ORDER — FAMCICLOVIR 500 MG/1
500 TABLET, FILM COATED ORAL DAILY
COMMUNITY
Start: 2025-05-16

## 2025-07-01 RX ORDER — FLUOXETINE 10 MG/1
30 CAPSULE ORAL DAILY
Qty: 270 CAPSULE | Refills: 1 | Status: SHIPPED | OUTPATIENT
Start: 2025-07-01 | End: 2025-09-29

## 2025-07-01 ASSESSMENT — PATIENT HEALTH QUESTIONNAIRE - PHQ9
SUM OF ALL RESPONSES TO PHQ9 QUESTIONS 1 AND 2: 0
2. FEELING DOWN, DEPRESSED OR HOPELESS: NOT AT ALL
1. LITTLE INTEREST OR PLEASURE IN DOING THINGS: NOT AT ALL

## 2025-07-01 NOTE — PROGRESS NOTES
Subjective   Patient ID: Lou Boateng is a 39 y.o. female who presents for Follow-up (6m follow up).  HPI    Overall patient states anxiety has been well-controlled with the fluoxetine.  She states she is not having the difficulty focusing or concentrating.  She is enjoying her children much more than she used to.  She feels like she does not have racing thoughts or intrusive thoughts.  She admits that when she for started taking the fluoxetine she felt like she had a lot more energy but she feels like that has gone back to her low points.  She often feels like she has to take a nap in the afternoon just always feels very tired.  She denies any snoring no hypersomnolence.      Review of Systems    Objective   Physical Exam  Constitutional:       Appearance: Normal appearance.   Cardiovascular:      Rate and Rhythm: Normal rate and regular rhythm.   Pulmonary:      Effort: Pulmonary effort is normal.      Breath sounds: Normal breath sounds.   Skin:     General: Skin is warm and dry.   Neurological:      Mental Status: She is alert.   Psychiatric:         Mood and Affect: Mood normal.         Behavior: Behavior normal.         Assessment & Plan  Breast cancer screening by mammogram    Orders:    BI mammo bilateral screening tomosynthesis; Future    Anxiety  Increase fluoxetine to 30mg daily to see if we can improve her fatigue.            Follow up in 6 mos for TORO

## 2025-07-08 ENCOUNTER — APPOINTMENT (OUTPATIENT)
Dept: PRIMARY CARE | Facility: CLINIC | Age: 40
End: 2025-07-08
Payer: COMMERCIAL

## 2025-07-30 ENCOUNTER — APPOINTMENT (OUTPATIENT)
Dept: RADIOLOGY | Facility: HOSPITAL | Age: 40
End: 2025-07-30
Payer: COMMERCIAL

## 2025-09-05 ENCOUNTER — APPOINTMENT (OUTPATIENT)
Dept: RADIOLOGY | Facility: HOSPITAL | Age: 40
End: 2025-09-05
Payer: COMMERCIAL

## 2026-01-26 ENCOUNTER — APPOINTMENT (OUTPATIENT)
Dept: PRIMARY CARE | Facility: CLINIC | Age: 41
End: 2026-01-26
Payer: COMMERCIAL

## 2026-01-27 ENCOUNTER — APPOINTMENT (OUTPATIENT)
Dept: PRIMARY CARE | Facility: CLINIC | Age: 41
End: 2026-01-27
Payer: COMMERCIAL